# Patient Record
Sex: FEMALE | Race: WHITE | NOT HISPANIC OR LATINO | URBAN - NONMETROPOLITAN AREA
[De-identification: names, ages, dates, MRNs, and addresses within clinical notes are randomized per-mention and may not be internally consistent; named-entity substitution may affect disease eponyms.]

---

## 2021-04-20 ENCOUNTER — WEB ENCOUNTER (OUTPATIENT)
Dept: URBAN - NONMETROPOLITAN AREA CLINIC 2 | Facility: CLINIC | Age: 26
End: 2021-04-20

## 2021-04-21 ENCOUNTER — LAB OUTSIDE AN ENCOUNTER (OUTPATIENT)
Dept: URBAN - NONMETROPOLITAN AREA CLINIC 2 | Facility: CLINIC | Age: 26
End: 2021-04-21

## 2021-04-21 ENCOUNTER — OFFICE VISIT (OUTPATIENT)
Dept: URBAN - NONMETROPOLITAN AREA CLINIC 2 | Facility: CLINIC | Age: 26
End: 2021-04-21
Payer: COMMERCIAL

## 2021-04-21 DIAGNOSIS — K59.04 CHRONIC IDIOPATHIC CONSTIPATION: ICD-10-CM

## 2021-04-21 DIAGNOSIS — R10.11 RUQ PAIN: ICD-10-CM

## 2021-04-21 DIAGNOSIS — Z86.010 PERSONAL HISTORY OF COLONIC POLYPS: ICD-10-CM

## 2021-04-21 DIAGNOSIS — R11.2 NON-INTRACTABLE VOMITING WITH NAUSEA, UNSPECIFIED VOMITING TYPE: ICD-10-CM

## 2021-04-21 PROCEDURE — 99204 OFFICE O/P NEW MOD 45 MIN: CPT | Performed by: NURSE PRACTITIONER

## 2021-04-21 RX ORDER — SULFAMETHOXAZOLE AND TRIMETHOPRIM 400; 80 MG/1; MG/1
2 TABLETS TABLET ORAL ONCE A DAY
Status: ACTIVE | COMMUNITY

## 2021-04-21 RX ORDER — ACETAZOLAMIDE 250 MG/1
2 TABLET TABLET ORAL TWICE DAILY
Status: ACTIVE | COMMUNITY

## 2021-04-21 RX ORDER — AMITRIPTYLINE HYDROCHLORIDE 25 MG/1
1 TABLET AT BEDTIME TABLET, FILM COATED ORAL ONCE A DAY
Status: ACTIVE | COMMUNITY

## 2021-04-21 RX ORDER — DICYCLOMINE HYDROCHLORIDE 10 MG/1
1 CAPSULE BEFORE MEALS PRN ABDOMINAL PAIN CAPSULE ORAL THREE TIMES A DAY
Qty: 90 CAPSULE | Refills: 3 | OUTPATIENT
Start: 2021-04-21 | End: 2021-08-19

## 2021-04-21 RX ORDER — METFORMIN HYDROCHLORIDE 850 MG/1
1 TABLET WITH A MEAL TABLET, FILM COATED ORAL ONCE A DAY
Status: ACTIVE | COMMUNITY

## 2021-04-21 RX ORDER — PANTOPRAZOLE SODIUM 40 MG/1
1 TABLET TABLET, DELAYED RELEASE ORAL ONCE A DAY
Qty: 90 TABLET | Refills: 3 | OUTPATIENT
Start: 2021-04-21

## 2021-04-21 RX ORDER — ONDANSETRON 4 MG/1
1 TABLET ON THE TONGUE AND ALLOW TO DISSOLVE TABLET, ORALLY DISINTEGRATING ORAL BID
Qty: 30 | Refills: 2 | OUTPATIENT
Start: 2021-04-21

## 2021-04-21 RX ORDER — LINACLOTIDE 290 UG/1
1 CAPSULE AT LEAST 30 MINUTES BEFORE THE FIRST MEAL OF THE DAY ON AN EMPTY STOMACH CAPSULE, GELATIN COATED ORAL ONCE A DAY
Status: ACTIVE | COMMUNITY

## 2021-04-21 NOTE — PHYSICAL EXAM GASTROINTESTINAL
Abdomen  , soft, moderate right upper quadrant tenderness, nondistended , no masses palpable , normal bowel sounds , no hepatomegaly present

## 2021-04-21 NOTE — HPI-TODAY'S VISIT:
4/21/2021 Mellisa Dillon is a very pleasant 26-year-old female with history of chronic idiopathic constipation on Linzess, and recent left kidney stone with mild left hydronephrosis status post ureteroscopy, stone extraction, stent placement 3/2021 by Dr. Nagel with stent removal 4/1/2021 with Dr. Justin Rutherford. She works at [employer]. She presents today for first visit with complaints of right upper quadrant pain that radiates to right shoulder, nausea and vomiting. Initially, her pain and nausea were postprandial. Now her symptoms are almost constant. She can become severely nauseated with smelling food. She has loss of appetite and some days only tolerates Ensure. She is pushing fluids. She reports her mother and father had gallbladder surgery.   She has seen a gastroenterologist in [place] previously. She had HIDA last week and tells me the ejection fraction was 42%. She had severe nausea and mild pain at the time of CCK injection. She did not have an ultrasound prior to this. She did have a CT A/P without contrast 3/3021 with normal appearing gallbladder. CT A/P without contrast 4/2021 with mild gallbladder distention possible related to fasting state but limited evaluation absent IV contrast. Her labs including LFTs have been normal.   She was readmitted April 1 for severe pain following stent removal. She reports after the admission she had worsening constipation and went several days without bowel movement despite taking Linzess 290mcg daily. she then began with urgent watery stools and is now having soft to mushy stools with less urgency. Denies rectal bleeding. She had colonoscopy 2019 by Dr. Adin Garvey with a few polyps removed and was told to repeat in 5 years. TG

## 2021-05-04 ENCOUNTER — TELEPHONE ENCOUNTER (OUTPATIENT)
Dept: URBAN - METROPOLITAN AREA CLINIC 92 | Facility: CLINIC | Age: 26
End: 2021-05-04

## 2021-05-04 ENCOUNTER — OFFICE VISIT (OUTPATIENT)
Dept: URBAN - NONMETROPOLITAN AREA SURGERY CENTER 1 | Facility: SURGERY CENTER | Age: 26
End: 2021-05-04
Payer: COMMERCIAL

## 2021-05-04 ENCOUNTER — LAB OUTSIDE AN ENCOUNTER (OUTPATIENT)
Dept: URBAN - METROPOLITAN AREA CLINIC 92 | Facility: CLINIC | Age: 26
End: 2021-05-04

## 2021-05-04 ENCOUNTER — CLAIMS CREATED FROM THE CLAIM WINDOW (OUTPATIENT)
Dept: URBAN - METROPOLITAN AREA CLINIC 4 | Facility: CLINIC | Age: 26
End: 2021-05-04
Payer: COMMERCIAL

## 2021-05-04 DIAGNOSIS — R10.11 ABDOMINAL BURNING SENSATION IN RIGHT UPPER QUADRANT: ICD-10-CM

## 2021-05-04 DIAGNOSIS — K31.89 REACTIVE GASTROPATHY: ICD-10-CM

## 2021-05-04 PROCEDURE — 88305 TISSUE EXAM BY PATHOLOGIST: CPT | Performed by: PATHOLOGY

## 2021-05-04 PROCEDURE — 43239 EGD BIOPSY SINGLE/MULTIPLE: CPT | Performed by: INTERNAL MEDICINE

## 2021-05-04 PROCEDURE — G8907 PT DOC NO EVENTS ON DISCHARG: HCPCS | Performed by: INTERNAL MEDICINE

## 2021-05-04 RX ORDER — PANTOPRAZOLE SODIUM 40 MG/1
1 TABLET TABLET, DELAYED RELEASE ORAL ONCE A DAY
Qty: 90 TABLET | Refills: 3 | Status: ACTIVE | COMMUNITY
Start: 2021-04-21

## 2021-05-04 RX ORDER — LINACLOTIDE 290 UG/1
1 CAPSULE AT LEAST 30 MINUTES BEFORE THE FIRST MEAL OF THE DAY ON AN EMPTY STOMACH CAPSULE, GELATIN COATED ORAL ONCE A DAY
Status: ACTIVE | COMMUNITY

## 2021-05-04 RX ORDER — SULFAMETHOXAZOLE AND TRIMETHOPRIM 400; 80 MG/1; MG/1
2 TABLETS TABLET ORAL ONCE A DAY
Status: ACTIVE | COMMUNITY

## 2021-05-04 RX ORDER — AMITRIPTYLINE HYDROCHLORIDE 25 MG/1
1 TABLET AT BEDTIME TABLET, FILM COATED ORAL ONCE A DAY
Status: ACTIVE | COMMUNITY

## 2021-05-04 RX ORDER — METFORMIN HYDROCHLORIDE 850 MG/1
1 TABLET WITH A MEAL TABLET, FILM COATED ORAL ONCE A DAY
Status: ACTIVE | COMMUNITY

## 2021-05-04 RX ORDER — DICYCLOMINE HYDROCHLORIDE 10 MG/1
1 CAPSULE BEFORE MEALS PRN ABDOMINAL PAIN CAPSULE ORAL THREE TIMES A DAY
Qty: 90 CAPSULE | Refills: 3 | Status: ACTIVE | COMMUNITY
Start: 2021-04-21 | End: 2021-08-19

## 2021-05-04 RX ORDER — ACETAZOLAMIDE 250 MG/1
2 TABLET TABLET ORAL TWICE DAILY
Status: ACTIVE | COMMUNITY

## 2021-05-04 RX ORDER — ONDANSETRON 4 MG/1
1 TABLET ON THE TONGUE AND ALLOW TO DISSOLVE TABLET, ORALLY DISINTEGRATING ORAL BID
Qty: 30 | Refills: 2 | Status: ACTIVE | COMMUNITY
Start: 2021-04-21

## 2021-05-20 ENCOUNTER — OFFICE VISIT (OUTPATIENT)
Dept: URBAN - NONMETROPOLITAN AREA CLINIC 13 | Facility: CLINIC | Age: 26
End: 2021-05-20
Payer: COMMERCIAL

## 2021-05-20 ENCOUNTER — DASHBOARD ENCOUNTERS (OUTPATIENT)
Age: 26
End: 2021-05-20

## 2021-05-20 ENCOUNTER — WEB ENCOUNTER (OUTPATIENT)
Dept: URBAN - NONMETROPOLITAN AREA CLINIC 13 | Facility: CLINIC | Age: 26
End: 2021-05-20

## 2021-05-20 DIAGNOSIS — R19.7 DIARRHEA, UNSPECIFIED TYPE: ICD-10-CM

## 2021-05-20 DIAGNOSIS — R11.2 NON-INTRACTABLE VOMITING WITH NAUSEA, UNSPECIFIED VOMITING TYPE: ICD-10-CM

## 2021-05-20 DIAGNOSIS — K59.04 CHRONIC IDIOPATHIC CONSTIPATION: ICD-10-CM

## 2021-05-20 DIAGNOSIS — Z86.010 PERSONAL HISTORY OF COLONIC POLYPS: ICD-10-CM

## 2021-05-20 DIAGNOSIS — R10.11 RUQ PAIN: ICD-10-CM

## 2021-05-20 PROCEDURE — 99214 OFFICE O/P EST MOD 30 MIN: CPT | Performed by: INTERNAL MEDICINE

## 2021-05-20 RX ORDER — LINACLOTIDE 290 UG/1
1 CAPSULE AT LEAST 30 MINUTES BEFORE THE FIRST MEAL OF THE DAY ON AN EMPTY STOMACH CAPSULE, GELATIN COATED ORAL ONCE A DAY
Status: DISCONTINUED | COMMUNITY

## 2021-05-20 RX ORDER — PANTOPRAZOLE SODIUM 40 MG/1
1 TABLET TABLET, DELAYED RELEASE ORAL ONCE A DAY
OUTPATIENT
Start: 2021-04-21

## 2021-05-20 RX ORDER — METFORMIN HYDROCHLORIDE 850 MG/1
1 TABLET WITH A MEAL TABLET, FILM COATED ORAL ONCE A DAY
Status: ACTIVE | COMMUNITY

## 2021-05-20 RX ORDER — PANTOPRAZOLE SODIUM 40 MG/1
1 TABLET TABLET, DELAYED RELEASE ORAL ONCE A DAY
Qty: 90 TABLET | Refills: 3 | Status: ACTIVE | COMMUNITY
Start: 2021-04-21

## 2021-05-20 RX ORDER — DICYCLOMINE HYDROCHLORIDE 10 MG/1
1 CAPSULE BEFORE MEALS PRN ABDOMINAL PAIN CAPSULE ORAL THREE TIMES A DAY
Qty: 90 CAPSULE | Refills: 3 | Status: ACTIVE | COMMUNITY
Start: 2021-04-21 | End: 2021-08-19

## 2021-05-20 RX ORDER — ACETAZOLAMIDE 250 MG/1
2 TABLET TABLET ORAL TWICE DAILY
Status: ACTIVE | COMMUNITY

## 2021-05-20 RX ORDER — SULFAMETHOXAZOLE AND TRIMETHOPRIM 400; 80 MG/1; MG/1
2 TABLETS TABLET ORAL ONCE A DAY
Status: ACTIVE | COMMUNITY

## 2021-05-20 RX ORDER — ONDANSETRON 4 MG/1
1 TABLET ON THE TONGUE AND ALLOW TO DISSOLVE TABLET, ORALLY DISINTEGRATING ORAL BID
Qty: 30 | Refills: 2 | Status: ACTIVE | COMMUNITY
Start: 2021-04-21

## 2021-05-20 RX ORDER — AMITRIPTYLINE HYDROCHLORIDE 25 MG/1
1 TABLET AT BEDTIME TABLET, FILM COATED ORAL ONCE A DAY
Status: ACTIVE | COMMUNITY

## 2021-05-20 RX ORDER — ONDANSETRON 4 MG/1
1 TABLET ON THE TONGUE AND ALLOW TO DISSOLVE TABLET, ORALLY DISINTEGRATING ORAL BID
Qty: 30 | Refills: 2 | OUTPATIENT

## 2021-05-20 RX ORDER — DICYCLOMINE HYDROCHLORIDE 10 MG/1
1 CAPSULE BEFORE MEALS PRN ABDOMINAL PAIN CAPSULE ORAL THREE TIMES A DAY
OUTPATIENT
Start: 2021-04-21

## 2021-05-20 NOTE — HPI-TODAY'S VISIT:
4/21/2021 Mellisa Dillon is a very pleasant 26-year-old female with history of chronic idiopathic constipation on Linzess, and recent left kidney stone with mild left hydronephrosis status post ureteroscopy, stone extraction, stent placement 3/2021 by Dr. Nagel with stent removal 4/1/2021 with Dr. Justin Rutherford. She works at [employer] [place]. She presents today for first visit with complaints of right upper quadrant pain that radiates to right shoulder, nausea and vomiting. Initially, her pain and nausea were postprandial. Now her symptoms are almost constant. She can become severely nauseated with smelling food. She has loss of appetite and some days only tolerates Ensure. She is pushing fluids. She reports her mother and father had gallbladder surgery.  She has seen a gastroenterologist in [place] previously. She had HIDA last week and tells me the ejection fraction was 42%. She had severe nausea and mild pain at the time of CCK injection. She did not have an ultrasound prior to this. She did have a CT A/P without contrast 3/3021 with normal appearing gallbladder. CT A/P without contrast 4/2021 with mild gallbladder distention possible related to fasting state but limited evaluation absent IV contrast. Her labs including LFTs have been normal.   She was readmitted April 1 for severe pain following stent removal. She reports after the admission she had worsening constipation and went several days without bowel movement despite taking Linzess 290mcg daily. she then began with urgent watery stools and is now having soft to mushy stools with less urgency. Denies rectal bleeding. She had colonoscopy 2019 by Dr. Adin Garvey with a few polyps removed and was told to repeat in 5 years. TG  5/20/21 Mellisa presents for follow up for RUQ, nausea, and vomiting. EGD 5/4/2021 with normal esophagus, stomach, and duodenum. Pathology with no significant findings. She continues with the above symptoms. She has had minimal improvement with pantoprazole and dicyclomine. US 4/27/21--no cholelithiasis and/or biliary duct dilatation; probable diffuse hepatic steatosis. No focal hepatic mass. Prior HIDA with EF 42%. I do not have a copy of the HIDA but will request this.   Her diarrhea is better since she stopped the Linzess. She is having 3-4 soft stools daily. No rectal bleeding. No lower abdominal pain. TG

## 2021-05-22 PROBLEM — 428283002: Status: ACTIVE | Noted: 2021-04-21

## 2021-05-22 PROBLEM — 82934008: Status: ACTIVE | Noted: 2021-04-21

## 2021-05-25 ENCOUNTER — OFFICE VISIT (OUTPATIENT)
Dept: URBAN - NONMETROPOLITAN AREA SURGERY CENTER 1 | Facility: SURGERY CENTER | Age: 26
End: 2021-05-25

## 2021-06-14 ENCOUNTER — TELEPHONE ENCOUNTER (OUTPATIENT)
Dept: URBAN - NONMETROPOLITAN AREA CLINIC 2 | Facility: CLINIC | Age: 26
End: 2021-06-14

## 2021-06-15 ENCOUNTER — LAB OUTSIDE AN ENCOUNTER (OUTPATIENT)
Dept: URBAN - NONMETROPOLITAN AREA CLINIC 2 | Facility: CLINIC | Age: 26
End: 2021-06-15

## 2021-06-16 ENCOUNTER — OFFICE VISIT (OUTPATIENT)
Dept: URBAN - NONMETROPOLITAN AREA CLINIC 2 | Facility: CLINIC | Age: 26
End: 2021-06-16

## 2021-08-19 ENCOUNTER — OFFICE VISIT (OUTPATIENT)
Dept: URBAN - NONMETROPOLITAN AREA CLINIC 13 | Facility: CLINIC | Age: 26
End: 2021-08-19